# Patient Record
Sex: MALE | Race: BLACK OR AFRICAN AMERICAN | NOT HISPANIC OR LATINO | URBAN - METROPOLITAN AREA
[De-identification: names, ages, dates, MRNs, and addresses within clinical notes are randomized per-mention and may not be internally consistent; named-entity substitution may affect disease eponyms.]

---

## 2024-05-09 ENCOUNTER — EMERGENCY (EMERGENCY)
Facility: HOSPITAL | Age: 19
LOS: 0 days | Discharge: HOME | End: 2024-05-09
Attending: EMERGENCY MEDICINE
Payer: MEDICAID

## 2024-05-09 VITALS
TEMPERATURE: 98 F | SYSTOLIC BLOOD PRESSURE: 109 MMHG | OXYGEN SATURATION: 98 % | DIASTOLIC BLOOD PRESSURE: 70 MMHG | HEART RATE: 71 BPM | RESPIRATION RATE: 17 BRPM

## 2024-05-09 VITALS
HEART RATE: 84 BPM | TEMPERATURE: 98 F | RESPIRATION RATE: 18 BRPM | OXYGEN SATURATION: 97 % | HEIGHT: 68 IN | SYSTOLIC BLOOD PRESSURE: 138 MMHG | DIASTOLIC BLOOD PRESSURE: 75 MMHG | WEIGHT: 175.05 LBS

## 2024-05-09 DIAGNOSIS — Z91.013 ALLERGY TO SEAFOOD: ICD-10-CM

## 2024-05-09 DIAGNOSIS — L50.9 URTICARIA, UNSPECIFIED: ICD-10-CM

## 2024-05-09 PROCEDURE — 99284 EMERGENCY DEPT VISIT MOD MDM: CPT

## 2024-05-09 RX ORDER — DIPHENHYDRAMINE HCL 50 MG
2 CAPSULE ORAL
Qty: 18 | Refills: 0
Start: 2024-05-09 | End: 2024-05-11

## 2024-05-09 NOTE — ED PROVIDER NOTE - OBJECTIVE STATEMENT
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 No airway involvement otherwise.

## 2024-05-09 NOTE — ED PROVIDER NOTE - NSFOLLOWUPCLINICS_GEN_ALL_ED_FT
[facility]  Allergy  [address] [place] [address]  Phone: [phone]  Fax:   Follow Up Time: 1-3 Days

## 2024-05-09 NOTE — ED PROVIDER NOTE - CLINICAL SUMMARY MEDICAL DECISION MAKING FREE TEXT BOX
Pt with hives/urticaria that has now improved after taking benadryl 50mg at home - will dc with outpt allergy/immunology follow up. Prescribed steroid medication in case reaction resumes.

## 2024-05-09 NOTE — ED PROVIDER NOTE - PATIENT PORTAL LINK FT
You can access the FollowMyHealth Patient Portal offered by [facility] by registering at the following website: [email or web address]. By joining [employer]’s FollowMyHealth portal, you will also be able to view your health information using other applications (apps) compatible with our system.

## 2024-05-09 NOTE — ED ADULT TRIAGE NOTE - CHIEF COMPLAINT QUOTE
biba from home c/o allergic reaction onset 12noon with pruritic hives to neck and torso no difficulty breathing denies throat itching no facial/orbital/oral swelling noted speaks full sentences without difficulty noted took benadryl 50mg at 1300 known allergies to cats dogs pollen roaches dust crabs and grass denies contact with any of those substances hx asthma no wheezing noted

## 2024-05-09 NOTE — ED PROVIDER NOTE - NSFOLLOWUPINSTRUCTIONS_ED_ALL_ED_FT
Hives  Hives (urticaria) are itchy, red, swollen areas of skin. They can show up on any part of the body. They often fade within 24 hours (acute hives). If you get new hives after the old ones fade and the cycle goes on for many days or weeks, it is called chronic hives. Hives do not spread from person to person (are not contagious).    Hives can happen when your body reacts to something you are allergic to (allergen) or to something that irritates your skin. When you are exposed to something that triggers hives, your body releases a chemical called histamine. This causes redness, itching, and swelling. Hives can show up right after you are exposed to a trigger or hours later.    What are the causes?  Hives may be caused by:  Food allergies.  Insect bites or stings.  Allergies to pollen or pets.  Spending time in sunlight, heat, or cold (exposure).  Exercise.  Stress.  You can also get hives from other conditions and treatments. These include:  Viruses, such as the common cold.  Bacterial infections, such as urinary tract infections and strep throat.  Certain medicines.  Contact with latex or chemicals.  Allergy shots.  Blood transfusions.  In some cases, the cause of hives is not known (idiopathic hives).    What increases the risk?  You are more likely to get hives if:  You are female.  You have food allergies. Hives are more common if you are allergic to citrus fruits, milk, eggs, peanuts, tree nuts, or shellfish.  You are allergic to:  Medicines.  Latex.  Insects.  Animals.  Pollen.  What are the signs or symptoms?  A red rash on a person's upper arm.  Common symptoms of hives include raised, itchy, red or white bumps or patches on your skin. These areas may:  Become large and swollen (welts).  Quickly change shape and location. This may happen more than once.  Be separate hives or connect over a large area of skin.  Sting or become painful.  Turn white when pressed in the center (josesito).  In severe cases, your hands, feet, and face may also become swollen. This may happen if hives form deeper in your skin.    How is this diagnosed?  Hives may be diagnosed based on your symptoms, medical history, and a physical exam.  You may have skin, pee (urine), or blood tests done. These can help find out what is causing your hives and rule out other health issues.  You may also have a biopsy done. This is when a small piece of skin is removed for testing.  How is this treated?  Treatment for hives depends on the cause and on how severe your symptoms are. You may be told to use cool, wet cloths (cool compresses) or to take cool showers to relieve itching. Treatment may also include:  Medicines to help:  Relieve itching (antihistamines).  Reduce swelling (corticosteroids).  Treat infection (antibiotics).  An injectable medicine called omalizumab. You may need this if you have chronic idiopathic hives and still have symptoms even after you are treated with antihistamines.  In severe cases, you may need to use a device filled with medicine that gives an emergency shot of epinephrine (auto-injector pen) to prevent a very bad allergic reaction (anaphylactic reaction).    Follow these instructions at home:  Medicines    Take and apply over-the-counter and prescription medicines only as told by your health care provider.  If you were prescribed antibiotics, take them as told by your provider. Do not stop using the antibiotic even if you start to feel better.  Skin care    Apply cool compresses to the affected areas.  Do not scratch or rub your skin.  General instructions    Do not take hot showers or baths. This can make itching worse.  Do not wear tight-fitting clothing.  Use sunscreen. Wear protective clothing when you are outside.  Avoid anything that causes your hives. Keep a journal to help track what causes your hives. Write down:  What medicines you take.  What you eat and drink.  What products you use on your skin.  Keep all follow-up visits. Your provider will track how well treatment is working.  Contact a health care provider if:  Your symptoms do not get better with medicine.  Your joints are painful or swollen.  You have a fever.  You have pain in your abdomen.  Get help right away if:  Your tongue, lips, or eyelids swell.  Your chest or throat feels tight.  You have trouble breathing or swallowing.  These symptoms may be an emergency. Use the auto-injector pen right away. Then call 911.  Do not wait to see if the symptoms will go away.  Do not drive yourself to the hospital.  This information is not intended to replace advice given to you by your health care provider. Make sure you discuss any questions you have with your health care provider.

## 2024-05-09 NOTE — ED PROVIDER NOTE - CONSTITUTIONAL NEGATIVE STATEMENT, MLM
Patient Education     Acute Otitis Media with Infection (Child)    Your child has a middle ear infection (acute otitis media). It is caused by bacteria or fungi. The middle ear is the space behind the eardrum. The eustachian tube connects the ear to the nasal passage. The eustachian tubes help drain fluid from the ears. They also keep the air pressure equal inside and outside the ears. These tubes are shorter and more horizontal in children. This makes it more likely for the tubes to become blocked. A blockage lets fluid and pressure build up in the middle ear. Bacteria or fungi can grow in this fluid and cause an ear infection. This infection is commonly known as an earache.  The main symptom of an ear infection is ear pain. Other symptoms may include pulling at the ear, being more fussy than usual, decreased appetie, vomiting or diarrhea.Your child’s hearing may also be affected. Your child may have had a respiratory infection first.  An ear infection may clear up on its own. Or your child may need to take medicine. After the infection goes away, your child may still have fluid in the middle ear. It may take weeks or months for this fluid to go away. During that time, your child may have temporary hearing loss. But all other symptoms of the earache should be gone.  Home care  Follow these guidelines when caring for your child at home:  · The health care provider will likely prescribe medicines for pain. The provider may also prescribe antibiotics or antifungals to treat the infection. These may be liquid medicines to give by mouth. Or they may be ear drops. Follow the provider’s instructions for giving these medicines to your child.  · Because ear infections can clear up on their own, the provider may suggest waiting for a few days before giving your child medicines for infection.  · To reduce pain, have your child rest in an upright position. Hot or cold compresses held against the ear may help ease  pain.  · Keep the ear dry. Have your child wear a shower cap when bathing.  To help prevent future infections:  · Avoid smoking near your child. Secondhand smoke raises the risk for ear infections in children.  · Make sure your child gets all appropriate vaccinations.  · Do not bottle feed while your baby is lying on his or her back. (This position can cause  middle ear infections because it allows milk to run into the eustacian tubes.)      · If you breastfeed ccontinue until your child is 6-12 months of age.  To apply ear drops:  1. Put the bottle in warm water if the medicine is kept in the refrigerator. Cold drops in the ear are uncomfortable.  2. Have your child lie down on a flat surface. Gently hold your child’s head to one side.  3. Remove any drainage from the ear with a clean tissue or cotton swab. Clean only the outer ear. Don’t put the cotton swab into the ear canal.  4. Straighten the ear canal by gently pulling the earlobe up and back.  5. Keep the dropper a half-inch above the ear canal. This will keep the dropper from becoming contaminated. Put the drops against the side of the ear canal.  6. Have your child stay lying down for 2 to 3 minutes. This gives time for the medicine to enter the ear canal. If your child doesn’t have pain, gently massage the outer ear near the opening.  7. Wipe any extra medicine away from the outer ear with a clean cotton ball.  Follow-up care  Follow up with your child’s healthcare provider as directed. Your child will need to have the ear rechecked to make sure the infection has resolved. Check with your doctor to see when they want to see your child.  Special note to parents  If your child continues to get earaches, he or she may need ear tubes. The provider will put small tubes in your child’s eardrum to help keep fluid from building up. This procedure is a simple and works well.  When to seek medical advice  Unless advised otherwise, call your child's healthcare  provider if:  · Your child is 3 months old or younger and has a fever of 100.4°F (38°C) or higher. Your child may need to see a healthcare provider.  · Your child is of any age and has fevers higher than 104°F (40°C) that come back again and again.  Call your child's healthcare provider for any of the following:  · New symptoms, especially swelling around the ear or weakness of face muscles  · Severe pain  · Infection seems to get worse, not better   · Neck pain  · Your child acts very sick or not themself  · Fever or pain do not improve with antibiotics after 48 hours  © 6148-1483 [employer]. [address] [place] [address], [place] [address]. All rights reserved. This information is not intended as a substitute for professional medical care. Always follow your healthcare professional's instructions.           Patient Education     When Your Child Has “Swimmer’s Ear”   If your child spends a lot of time in the water and is having ear pain, he or she may have developed otitis externa. This is also known as “swimmer’s ear.” It is a skin infection that happens in the ear canal, between the opening of the ear and the eardrum. When the ear canal becomes too moist, bacteria can grow. This causes pain, swelling, and redness in the ear canal.  What causes swimmer’s ear?  The most common causes of swimmer’s ear in children are:  · Swimming or lying down in a bathtub or hot tub.  · Roughly cleaning the ear canal. This causes tiny cuts or scratches that easily get infected.  · Ear canals that are naturally narrow.  · Excess earwax that traps fluid in the ear canal.  What are the symptoms of swimmer’s ear?     The most common symptoms of swimmer’s ear are:  · Ear pain, especially when pulling on the earlobe or when chewing  · Redness or swelling in the ear canal or near the ear  · Itching in the ear  · Drainage from the ear  · Feeling like water is in the ear  · Fever  · Problems hearing  How is swimmer’s ear  diagnosed?  The health care provider will examine your child. He or she will also ask questions to help rule out other causes of ear pain. The health care provider will look for:  · Redness and swelling in the ear canal  · Drainage from the ear canal  How is swimmer’s ear treated?  To treat your child’s ear, the health care provider may recommend:  · Medications, such as antibiotic eardrops or a topical ear anesthetic (to relieve pain). Oral (taken by mouth) antibiotics is not recommended.  · Over-the-counter pain relievers such as acetaminophen and ibuprofen. Do not give ibuprofen to infants less than 6 months of age or to children who are dehydrated or constantly vomiting. Don’t give your child aspirin to relieve a fever. Using aspirin to treat a fever in children could cause a serious condition called Reye’s syndrome.  How is swimmer’s ear prevented?  Ask your child's health care provider about using the following to help prevent swimmer’s ear:  · After your child has been in the water, have your child tilt his or her head to each side to help any water drain out. You can also dry his or her ear canal using a blow dryer. Using a LOW AIR  and COOL setting, hold the dryer at least 12 inches from your child’s head. Wave the dryer slowly back and forth -- don’t hold it still. You may also gently pull the earlobe down and slightly backward to allow the air to reach the ear canal.  · Use a tissue to gently draw water out of the ear. Your child’s health care provider can show you how.  · Use over-the-counter eardrops if the healthcare provider suggests. These help dry out the inside of your child’s ear. Smaller children may need to lie down on a couch or bed for a short time to keep the drops inside the ear canal.  · Gently clean your child’s ear canal. Do not use cotton swabs.  Call your child’s health care provider if your child has any of the following:  · Increased pain redness, or swelling of the outer ear  · Ear  pain, redness, or swelling that does not go away with treatment  · Fever:  ¨ A rectal temperature of 100.4°F (38.0°C) or higher in an infant younger than 3 months  ¨ A repeated temperature of 104°F (40°C) or higher in a child of any age  ¨ A fever that lasts more than 24-hours in a child younger than 2 years, or for 3 days in a child 2 years or older  ¨ A seizure caused by the fever. Call 911 or your local emergency number.   © 3221-3019 [employer]. [address] [place] [address]. All rights reserved. This information is not intended as a substitute for professional medical care. Always follow your healthcare professional's instructions.            no fever and no chills.

## 2024-05-09 NOTE — ED ADULT NURSE NOTE - CAS EDN DISCHARGE ASSESSMENT
Pt A&Ox3 ambulates with equal steady gait in no acute distress./Alert and oriented to person, place and time/Patient baseline mental status/Awake

## 2024-05-09 NOTE — ED ADULT NURSE NOTE - OBJECTIVE STATEMENT
pt is AOX4 biba from home c/o allergic reaction onset 12noon with pruritic hives to neck and torso no difficulty breathing denies throat itching no facial/orbital/oral swelling noted speaks full sentences without difficulty noted took benadryl 50mg at 1300 known allergies to cats dogs pollen roaches dust crabs and grass denies contact with any of those substances hx asthma no wheezing noted